# Patient Record
Sex: MALE | Race: WHITE | Employment: FULL TIME | URBAN - METROPOLITAN AREA
[De-identification: names, ages, dates, MRNs, and addresses within clinical notes are randomized per-mention and may not be internally consistent; named-entity substitution may affect disease eponyms.]

---

## 2023-07-20 ENCOUNTER — HOSPITAL ENCOUNTER (OUTPATIENT)
Age: 38
Discharge: HOME | End: 2023-07-20
Payer: COMMERCIAL

## 2023-07-20 ENCOUNTER — APPOINTMENT (OUTPATIENT)
Dept: GENERAL RADIOLOGY | Age: 38
End: 2023-07-20
Attending: NURSE PRACTITIONER
Payer: COMMERCIAL

## 2023-07-20 VITALS
RESPIRATION RATE: 18 BRPM | DIASTOLIC BLOOD PRESSURE: 73 MMHG | WEIGHT: 180 LBS | HEART RATE: 58 BPM | HEIGHT: 68 IN | TEMPERATURE: 99 F | BODY MASS INDEX: 27.28 KG/M2 | OXYGEN SATURATION: 98 % | SYSTOLIC BLOOD PRESSURE: 132 MMHG

## 2023-07-20 DIAGNOSIS — R07.9 CHEST PAIN OF UNCERTAIN ETIOLOGY: Primary | ICD-10-CM

## 2023-07-20 LAB
#MXD IC: 0.6 X10ˆ3/UL (ref 0.1–1)
ATRIAL RATE: 57 BPM
BUN BLD-MCNC: 30 MG/DL (ref 7–18)
CHLORIDE BLD-SCNC: 107 MMOL/L (ref 98–112)
CO2 BLD-SCNC: 23 MMOL/L (ref 21–32)
CREAT BLD-MCNC: 1.3 MG/DL
EGFRCR SERPLBLD CKD-EPI 2021: 73 ML/MIN/1.73M2 (ref 60–?)
GLUCOSE BLD-MCNC: 101 MG/DL (ref 70–99)
HCT VFR BLD AUTO: 42.2 %
HCT VFR BLD CALC: 46 %
HGB BLD-MCNC: 14.4 G/DL
ISTAT IONIZED CALCIUM FOR CHEM 8: 1.12 MMOL/L (ref 1.12–1.32)
LYMPHOCYTES # BLD AUTO: 1.3 X10ˆ3/UL (ref 1–4)
LYMPHOCYTES NFR BLD AUTO: 26.9 %
MCH RBC QN AUTO: 32.1 PG (ref 26–34)
MCHC RBC AUTO-ENTMCNC: 34.1 G/DL (ref 31–37)
MCV RBC AUTO: 94.2 FL (ref 80–100)
MIXED CELL %: 12 %
NEUTROPHILS # BLD AUTO: 2.8 X10ˆ3/UL (ref 1.5–7.7)
NEUTROPHILS NFR BLD AUTO: 61.1 %
P AXIS: 48 DEGREES
P-R INTERVAL: 146 MS
PLATELET # BLD AUTO: 185 X10ˆ3/UL (ref 150–450)
POTASSIUM BLD-SCNC: 3.6 MMOL/L (ref 3.6–5.1)
Q-T INTERVAL: 404 MS
QRS DURATION: 98 MS
QTC CALCULATION (BEZET): 393 MS
R AXIS: 67 DEGREES
RBC # BLD AUTO: 4.48 X10ˆ6/UL
SODIUM BLD-SCNC: 140 MMOL/L (ref 136–145)
T AXIS: 55 DEGREES
TROPONIN I BLD-MCNC: <0.02 NG/ML
VENTRICULAR RATE: 57 BPM
WBC # BLD AUTO: 4.7 X10ˆ3/UL (ref 4–11)

## 2023-07-20 PROCEDURE — 71046 X-RAY EXAM CHEST 2 VIEWS: CPT | Performed by: NURSE PRACTITIONER

## 2023-07-20 RX ORDER — MAGNESIUM HYDROXIDE/ALUMINUM HYDROXICE/SIMETHICONE 120; 1200; 1200 MG/30ML; MG/30ML; MG/30ML
30 SUSPENSION ORAL ONCE
Status: COMPLETED | OUTPATIENT
Start: 2023-07-20 | End: 2023-07-20

## 2023-07-20 RX ORDER — LIDOCAINE HYDROCHLORIDE 20 MG/ML
5 SOLUTION OROPHARYNGEAL ONCE
Status: COMPLETED | OUTPATIENT
Start: 2023-07-20 | End: 2023-07-20

## 2023-07-20 NOTE — ED INITIAL ASSESSMENT (HPI)
Pt complaining of chest pain for a year. Pt states he stopped smoking cigarrettes a month ago. Pt states he just smokes marijuana.

## 2023-07-20 NOTE — DISCHARGE INSTRUCTIONS
Follow-up with your primary care provider recommend following up with a cardiologist.  Continue to eat and drink and monitor what you eat. You may try over-the-counter omeprazole as symptoms may be related to acid reflux or indigestion. You may take over-the-counter Tylenol or ibuprofen for pain. If you develop worsening pain shortness of breath weakness numbness or tingling to extremities severe headache dizziness nausea or vomiting abdominal pain or back pain or urinary symptoms go to the nearest emergency department.

## 2024-08-18 ENCOUNTER — APPOINTMENT (OUTPATIENT)
Dept: GENERAL RADIOLOGY | Age: 39
End: 2024-08-18
Attending: PHYSICIAN ASSISTANT
Payer: COMMERCIAL

## 2024-08-18 ENCOUNTER — HOSPITAL ENCOUNTER (OUTPATIENT)
Age: 39
Discharge: HOME | End: 2024-08-18
Payer: COMMERCIAL

## 2024-08-18 VITALS
WEIGHT: 182 LBS | TEMPERATURE: 98 F | BODY MASS INDEX: 27.58 KG/M2 | DIASTOLIC BLOOD PRESSURE: 66 MMHG | RESPIRATION RATE: 20 BRPM | HEIGHT: 68 IN | SYSTOLIC BLOOD PRESSURE: 119 MMHG | HEART RATE: 58 BPM | OXYGEN SATURATION: 99 %

## 2024-08-18 DIAGNOSIS — S93.402A SPRAIN OF LEFT ANKLE, UNSPECIFIED LIGAMENT, INITIAL ENCOUNTER: Primary | ICD-10-CM

## 2024-08-18 DIAGNOSIS — S09.90XA CLOSED HEAD INJURY WITHOUT LOSS OF CONSCIOUSNESS, INITIAL ENCOUNTER: ICD-10-CM

## 2024-08-18 DIAGNOSIS — M25.579 ANKLE PAIN: ICD-10-CM

## 2024-08-18 PROCEDURE — L4350 ANKLE CONTROL ORTHO PRE OTS: HCPCS | Performed by: PHYSICIAN ASSISTANT

## 2024-08-18 PROCEDURE — A6449 LT COMPRES BAND >=3" <5"/YD: HCPCS | Performed by: PHYSICIAN ASSISTANT

## 2024-08-18 PROCEDURE — 99213 OFFICE O/P EST LOW 20 MIN: CPT | Performed by: PHYSICIAN ASSISTANT

## 2024-08-18 PROCEDURE — 73610 X-RAY EXAM OF ANKLE: CPT | Performed by: PHYSICIAN ASSISTANT

## 2024-08-18 RX ORDER — IBUPROFEN 600 MG/1
TABLET, FILM COATED ORAL
Qty: 20 TABLET | Refills: 0 | Status: SHIPPED | OUTPATIENT
Start: 2024-08-18

## 2024-08-18 NOTE — ED PROVIDER NOTES
Patient Seen in: [facility]    History     Chief Complaint   Patient presents with    Fall     Stated Complaint: Fracture, Minor - Ankle fracture    HPI    HPI: Stanley Arzate is a 38 year old male who presents with chief complaint of left ankle pain.  Onset 2 days ago.  Patient states that he sustained a mechanical fall injuring his left ankle and hitting his head.  Patient reports associated swelling and ecchymosis of left ankle.  Patient denies other injury, neck pain or injury, loss of consciousness, altered mental status, nausea, vomiting, amnesia, weakness, paresthesias, vision changes, open wound, decreased range of motion, erythema.      History reviewed. No pertinent past medical history.    History reviewed. No pertinent surgical history.         No family history on file.    Social History     Socioeconomic History    Marital status: Single   Tobacco Use    Smoking status: Former     Current packs/day: 0.00     Types: Cigarettes     Quit date: 2022     Years since quittin.0    Smokeless tobacco: Never   Substance and Sexual Activity    Alcohol use: Yes    Drug use: Never       Review of Systems    Positive for stated complaint: Fracture, Minor - Ankle fracture  Other systems are as noted in HPI.  Constitutional and vital signs reviewed.      All other systems reviewed and negative except as noted above.    PSFH elements reviewed from today and agreed except as otherwise stated in HPI.    Physical Exam     ED Triage Vitals [24 0952]   /66   Pulse 58   Resp 20   Temp 97.8 °F (36.6 °C)   Temp src Temporal   SpO2 99 %   O2 Device None (Room air)       Current:/66   Pulse 58   Temp 97.8 °F (36.6 °C) (Temporal)   Resp 20   Ht 172.7 cm (5' 8\")   Wt 82.6 kg   SpO2 99%   BMI 27.67 kg/m²     PULSE OX within normal limits on room air as interpreted by this provider.    Physical Exam    Constitutional: The patient is cooperative. Appears well-developed and  well-nourished.  Mild discomfort.  Psychological: Alert, No abnormalities of mood, affect.  Head: Normocephalic/atraumatic. Nontender. No Sy sign, hemotympanum, raccoon sign.  Eyes: Pupils are equal round reactive to light. Conjunctiva are within normal limits. Extraocular motions intact bilaterally.  ENT: Oropharynx is clear.  Neck: The neck is supple. No meningeal signs. Trachea normal. Nontender to palpation. No contusions. No abrasions. No penetrating injury.  Chest: There is no tenderness to the chest wall.  Respiratory: Respiratory effort was normal. There is no stridor. Air entry is equal.  Cardiovascular: Bradycardic, regular rhythm. Capillary refill is brisk.  Genitourinary: Not examined.  Lymphatic: No gross lymphadenopathy noted.  Musculoskeletal: Left lower extremity-Left lower extremity without acute bony deformity.  Positive swelling, ecchymosis and tenderness to palpation present at left lateral malleolus.  Left foot nontender to palpation.  Remainder of left lower extremity nontender to palpation.  Full range of motion of left ankle with reported pain.  Distal pulses intact.  Capillary refill normal.  Motor intact distally.  Sensory intact distally.  No erythema.  No open wounds.  No compartment syndrome.  No other edema.  Remainder of musculoskeletal system is grossly intact.  There is no obvious deformity.  Neurological: No facial asymmetry.  Normal gait.  Normal sensory exam.  Patient exhibits normal speech.  Strength and range of motion symmetrical of all extremities x4.  Skin: Skin is normal to inspection and palpation, except as documented. Warm and dry. No obvious rash. No open wounds.        ED Course   Labs Reviewed - No data to display  Patient fitted and Velcro ankle splint applied to left lower extremity.  Distal neurovascular intact of left lower extremity following application.  MDM     Head CT scan not recommended via [place] head CT rule.    Differential diagnosis prior to  work-up including but not limited to fracture, strain/sprain, contusion    Radiology findings: XR ANKLE (MIN 3 VIEWS), LEFT (CPT=73610)    Result Date: 8/18/2024  CONCLUSION:   No acute fracture or dislocation.  Soft tissue swelling about the ankle.     Dictated by (CST): Manuel Pfeiffer MD on 8/18/2024 at 10:32 AM     Finalized by (CST): Manuel Pfeiffer MD on 8/18/2024 at 10:33 AM           X-ray images of left ankle independently viewed by this provider-no acute fracture.     Physical exam remained stable as previously documented.  Neuroexam stable.  Available results reviewed with patient.    I have given the patient instructions regarding their diagnoses, expectations, follow up, and ER precautions. I explained to the patient that emergent conditions may arise and to go to the ER for new, worsening or any persistent conditions. I've explained the importance of following up with their doctor as instructed. The patient verbalized understanding of the discharge instructions and plan.    Disposition and Plan     Clinical Impression:  1. Sprain of left ankle, unspecified ligament, initial encounter    2. Ankle pain    3. Closed head injury without loss of consciousness, initial encounter        Disposition:  Discharge    Follow-up:  Fabrizio Peck MD  [address] [address]  [phone]    Call in 1 day  For follow-up    Red Daly MD  [address] [facility]  [address]  [facility] [id number] [phone]      For follow-up, As needed      Medications Prescribed:  Current Discharge Medication List        START taking these medications    Details   ibuprofen 600 MG Oral Tab Take 1 tablet (600 mg total) by mouth every 6 hours with food  Qty: 20 tablet, Refills: 0

## 2024-08-18 NOTE — ED INITIAL ASSESSMENT (HPI)
Patient presents to IC with c/o trip/fall on Friday and also tripped down 2 stairs last night.  + head injury.  Denies LOC/use of thinners.   Reports left ankle pain.